# Patient Record
Sex: FEMALE | Race: BLACK OR AFRICAN AMERICAN | NOT HISPANIC OR LATINO | Employment: FULL TIME | URBAN - METROPOLITAN AREA
[De-identification: names, ages, dates, MRNs, and addresses within clinical notes are randomized per-mention and may not be internally consistent; named-entity substitution may affect disease eponyms.]

---

## 2018-03-16 ENCOUNTER — OFFICE VISIT (OUTPATIENT)
Dept: FAMILY MEDICINE | Facility: CLINIC | Age: 53
End: 2018-03-16
Attending: FAMILY MEDICINE
Payer: COMMERCIAL

## 2018-03-16 ENCOUNTER — HOSPITAL ENCOUNTER (OUTPATIENT)
Dept: RADIOLOGY | Facility: OTHER | Age: 53
Discharge: HOME | End: 2018-03-16
Attending: FAMILY MEDICINE
Payer: COMMERCIAL

## 2018-03-16 VITALS
WEIGHT: 218.31 LBS | DIASTOLIC BLOOD PRESSURE: 82 MMHG | SYSTOLIC BLOOD PRESSURE: 120 MMHG | HEIGHT: 63 IN | BODY MASS INDEX: 38.68 KG/M2 | OXYGEN SATURATION: 97 % | RESPIRATION RATE: 16 BRPM | HEART RATE: 58 BPM

## 2018-03-16 DIAGNOSIS — R82.90 ABNORMAL URINE ODOR: ICD-10-CM

## 2018-03-16 DIAGNOSIS — Z01.419 ENCOUNTER FOR GYNECOLOGICAL EXAMINATION: ICD-10-CM

## 2018-03-16 DIAGNOSIS — Z12.31 ENCOUNTER FOR SCREENING MAMMOGRAM FOR BREAST CANCER: ICD-10-CM

## 2018-03-16 DIAGNOSIS — N31.8 FREQUENCY-URGENCY SYNDROME: ICD-10-CM

## 2018-03-16 DIAGNOSIS — Z00.00 ROUTINE GENERAL MEDICAL EXAMINATION AT A HEALTH CARE FACILITY: Primary | ICD-10-CM

## 2018-03-16 DIAGNOSIS — Z00.00 LABORATORY EXAM ORDERED AS PART OF ROUTINE GENERAL MEDICAL EXAMINATION: ICD-10-CM

## 2018-03-16 DIAGNOSIS — K21.9 GASTROESOPHAGEAL REFLUX DISEASE, ESOPHAGITIS PRESENCE NOT SPECIFIED: ICD-10-CM

## 2018-03-16 DIAGNOSIS — Z11.59 NEED FOR HEPATITIS C SCREENING TEST: ICD-10-CM

## 2018-03-16 DIAGNOSIS — Z12.11 COLON CANCER SCREENING: ICD-10-CM

## 2018-03-16 LAB
AMORPH CRY UR QL COMP ASSIST: NORMAL
BILIRUB UR QL STRIP: NEGATIVE
CLARITY UR REFRACT.AUTO: CLEAR
COLOR UR AUTO: YELLOW
GLUCOSE UR QL STRIP: NEGATIVE
HGB UR QL STRIP: ABNORMAL
KETONES UR QL STRIP: NEGATIVE
LEUKOCYTE ESTERASE UR QL STRIP: NEGATIVE
MICROSCOPIC COMMENT: NORMAL
NITRITE UR QL STRIP: NEGATIVE
PH UR STRIP: 6 [PH] (ref 5–8)
PROT UR QL STRIP: NEGATIVE
RBC #/AREA URNS AUTO: 2 /HPF (ref 0–4)
SP GR UR STRIP: 1.01 (ref 1–1.03)
SQUAMOUS #/AREA URNS AUTO: 0 /HPF
URN SPEC COLLECT METH UR: ABNORMAL
UROBILINOGEN UR STRIP-ACNC: NEGATIVE EU/DL
WBC #/AREA URNS AUTO: 1 /HPF (ref 0–5)

## 2018-03-16 PROCEDURE — 87077 CULTURE AEROBIC IDENTIFY: CPT

## 2018-03-16 PROCEDURE — 99386 PREV VISIT NEW AGE 40-64: CPT | Mod: S$GLB,,, | Performed by: FAMILY MEDICINE

## 2018-03-16 PROCEDURE — 87088 URINE BACTERIA CULTURE: CPT

## 2018-03-16 PROCEDURE — 77063 BREAST TOMOSYNTHESIS BI: CPT | Mod: 26,,, | Performed by: RADIOLOGY

## 2018-03-16 PROCEDURE — 77067 SCR MAMMO BI INCL CAD: CPT | Mod: 26,,, | Performed by: RADIOLOGY

## 2018-03-16 PROCEDURE — 99999 PR PBB SHADOW E&M-NEW PATIENT-LVL IV: CPT | Mod: PBBFAC,,, | Performed by: FAMILY MEDICINE

## 2018-03-16 PROCEDURE — 87186 SC STD MICRODIL/AGAR DIL: CPT

## 2018-03-16 PROCEDURE — 87086 URINE CULTURE/COLONY COUNT: CPT

## 2018-03-16 PROCEDURE — 77067 SCR MAMMO BI INCL CAD: CPT | Mod: TC

## 2018-03-16 PROCEDURE — 81001 URINALYSIS AUTO W/SCOPE: CPT

## 2018-03-16 RX ORDER — OMEPRAZOLE 40 MG/1
40 CAPSULE, DELAYED RELEASE ORAL DAILY
Qty: 30 CAPSULE | Refills: 1 | Status: SHIPPED | OUTPATIENT
Start: 2018-03-16 | End: 2019-03-16

## 2018-03-16 NOTE — PROGRESS NOTES
Subjective:       Patient ID: Bridget Ibarra is a 52 y.o. female.    Chief Complaint: No chief complaint on file.    The patient presents today for first visit with me.    Urinary Tract Infection    This is a recurrent problem. The current episode started more than 1 year ago. The problem occurs intermittently. The pain is at a severity of 0/10. There has been no fever. There is a history of pyelonephritis. Associated symptoms include frequency, urgency and bubble bath use. Pertinent negatives include no chills, discharge, flank pain, hematuria, nausea or vomiting. She has tried nothing for the symptoms. There is no history of diabetes mellitus, kidney stones or STD.   Gastroesophageal Reflux   She complains of belching, choking, dysphagia, early satiety and heartburn. She reports no abdominal pain, no globus sensation, no nausea or no sore throat. This is a recurrent problem. The current episode started more than 1 year ago. The problem has been waxing and waning. The heartburn duration is several minutes. The heartburn is located in the substernum. The heartburn does not wake her from sleep. The heartburn does not limit her activity. The heartburn doesn't change with position. The symptoms are aggravated by caffeine and certain foods. Risk factors include caffeine use, lack of exercise and obesity. She has tried a histamine-2 antagonist for the symptoms. The treatment provided mild relief. Past procedures do not include an abdominal ultrasound, an EGD or a UGI. Past invasive treatments include reflux surgery. Past invasive treatments do not include gastroplasty or gastroplication.     There is no problem list on file for this patient.      Past Surgical History:   Procedure Laterality Date     SECTION, CLASSIC      ORIF WRIST FRACTURE Right 2016         Current Outpatient Prescriptions:     omeprazole (PRILOSEC) 40 MG capsule, Take 1 capsule (40 mg total) by mouth once daily., Disp: 30 capsule, Rfl:  1    Review of patient's allergies indicates:  No Known Allergies    Family History   Problem Relation Age of Onset    Arthritis Mother     Parkinsonism Father     Diabetes Sister     No Known Problems Brother     No Known Problems Sister     No Known Problems Brother     No Known Problems Brother     Alcohol abuse Brother     No Known Problems Brother      GSW       Social History     Social History    Marital status: Single     Spouse name: N/A    Number of children: 3    Years of education: N/A     Occupational History          Social History Main Topics    Smoking status: Never Smoker    Smokeless tobacco: Never Used    Alcohol use No    Drug use: No    Sexual activity: Not Currently     Other Topics Concern    Not on file     Social History Narrative    The patient does not exercise regularly ().      She is not satisfied with weight.    Rates diet as fair.    She does not drink at least 1/2 gallon water daily.    She drinks 0-1 coffee/tea/caffeine-containing soft drinks daily.    Total sleep time at night is 4 hours.    She works 40 hours per week.    She does wear seat belts.    Hobbies include none.           OB History      Para Term  AB Living    4 3 3   1      SAB TAB Ectopic Multiple Live Births                     Obstetric Comments    Menarche age 14.  LMP age 49.  First child born age 16.  History of abnormal PAP smear: NO.  History of abnormal mammogram: NO.  History of sexually transmitted disease:  NO            Patient Care Team:  Omar Quinonez Jr., MD as PCP - General (Family Medicine)      Review of Systems   Constitutional: Negative for chills.   HENT: Negative for sore throat.    Respiratory: Positive for choking.    Gastrointestinal: Positive for dysphagia and heartburn. Negative for abdominal pain, nausea and vomiting.   Genitourinary: Positive for frequency and urgency. Negative for flank pain and hematuria.       Objective:      Physical Exam    Constitutional: She is oriented to person, place, and time. She appears well-developed and well-nourished. She is cooperative.   HENT:   Head: Normocephalic and atraumatic.   Nose: Nose normal.   Mouth/Throat: Oropharynx is clear and moist and mucous membranes are normal.   Eyes: Conjunctivae are normal. No scleral icterus.   Neck: Neck supple. No JVD present. Carotid bruit is not present. No thyromegaly present.   Cardiovascular: Normal rate, regular rhythm, normal heart sounds and normal pulses.  Exam reveals no gallop and no friction rub.    No murmur heard.  Pulmonary/Chest: Effort normal and breath sounds normal. She has no wheezes. She has no rhonchi. She has no rales.   Abdominal: Soft. Bowel sounds are normal. She exhibits no distension and no mass. There is no splenomegaly or hepatomegaly. There is no tenderness.   Musculoskeletal: Normal range of motion. She exhibits no edema or tenderness.   Lymphadenopathy:     She has no cervical adenopathy.     She has no axillary adenopathy.   Neurological: She is alert and oriented to person, place, and time. She has normal strength and normal reflexes. No cranial nerve deficit or sensory deficit.   Skin: Skin is warm and dry.   Psychiatric: She has a normal mood and affect. Her speech is normal.   Vitals reviewed.        Assessment:       1. Routine general medical examination at a health care facility    2. Frequency-urgency syndrome    3. Abnormal urine odor    4. Gastroesophageal reflux disease, esophagitis presence not specified    5. Colon cancer screening    6. Encounter for screening mammogram for breast cancer    7. Need for hepatitis C screening test    8. Laboratory exam ordered as part of routine general medical examination        Plan:       Laboratory investigation, including diabetes and thyroid screening, serum chemistries, and lipid profile.  Discussed routine examinations and screenings.   TdaP today.   Refer for mammogram & colonoscopy.    Refer  "for GYN exam.  Omeprazole x 8 weeks.  If no improvement (or if symptoms return shortly after 8 week trial completed), refer for EGD.  U/A w/C+S today.  We will call the patient with results & make further recommendations at that time.  Further recommendations to follow after above.          "This note will not be shared with the patient."  "

## 2018-03-16 NOTE — PATIENT INSTRUCTIONS
Your test results will be communicated to you via : [facility], Telephone or Letter.   If you have not received your test results in one week, please contact the clinic at [phone].

## 2018-03-19 ENCOUNTER — TELEPHONE (OUTPATIENT)
Dept: FAMILY MEDICINE | Facility: CLINIC | Age: 53
End: 2018-03-19

## 2018-03-19 LAB — BACTERIA UR CULT: NORMAL

## 2018-03-19 RX ORDER — NITROFURANTOIN 25; 75 MG/1; MG/1
100 CAPSULE ORAL 2 TIMES DAILY
Qty: 14 CAPSULE | Refills: 0 | Status: SHIPPED | OUTPATIENT
Start: 2018-03-19 | End: 2018-03-26

## 2018-03-19 NOTE — TELEPHONE ENCOUNTER
Results reviewed with patient.    Urine culture was positive for Escherichia coli.  Prescription sent to pharmacy.  Diabetes, hepatitis C and thyroid screenings are negative.  Liver and kidney function are normal.  Lipid profile reveals acceptable cholesterol levels.  Blood count reveals no anemia.

## 2018-03-20 ENCOUNTER — TELEPHONE (OUTPATIENT)
Dept: FAMILY MEDICINE | Facility: CLINIC | Age: 53
End: 2018-03-20

## 2018-03-20 NOTE — TELEPHONE ENCOUNTER
----- Message from Omar Quinonez Jr., MD sent at 3/19/2018  5:28 PM CDT -----  Please inform the patient that recent mammogram was normal.  Recommend repeat mammogram in 1-2 years.

## 2018-07-03 ENCOUNTER — TELEPHONE (OUTPATIENT)
Dept: ENDOSCOPY | Facility: HOSPITAL | Age: 53
End: 2018-07-03

## 2018-10-27 ENCOUNTER — TELEPHONE (OUTPATIENT)
Dept: ENDOSCOPY | Facility: HOSPITAL | Age: 53
End: 2018-10-27